# Patient Record
Sex: MALE | Race: WHITE
[De-identification: names, ages, dates, MRNs, and addresses within clinical notes are randomized per-mention and may not be internally consistent; named-entity substitution may affect disease eponyms.]

---

## 2018-11-21 PROBLEM — Z00.00 ENCOUNTER FOR PREVENTIVE HEALTH EXAMINATION: Status: ACTIVE | Noted: 2018-11-21

## 2018-11-26 ENCOUNTER — APPOINTMENT (OUTPATIENT)
Dept: ORTHOPEDIC SURGERY | Facility: CLINIC | Age: 58
End: 2018-11-26
Payer: COMMERCIAL

## 2018-11-26 VITALS
WEIGHT: 220 LBS | BODY MASS INDEX: 28.23 KG/M2 | DIASTOLIC BLOOD PRESSURE: 71 MMHG | HEART RATE: 65 BPM | HEIGHT: 74 IN | SYSTOLIC BLOOD PRESSURE: 126 MMHG

## 2018-11-26 PROCEDURE — 20550 NJX 1 TENDON SHEATH/LIGAMENT: CPT | Mod: F3

## 2018-11-26 PROCEDURE — 99203 OFFICE O/P NEW LOW 30 MIN: CPT | Mod: 25

## 2018-11-26 RX ORDER — OMEPRAZOLE MAGNESIUM 20 MG/1
20 TABLET, DELAYED RELEASE ORAL
Refills: 0 | Status: ACTIVE | COMMUNITY

## 2019-03-18 ENCOUNTER — APPOINTMENT (OUTPATIENT)
Dept: ORTHOPEDIC SURGERY | Facility: CLINIC | Age: 59
End: 2019-03-18
Payer: COMMERCIAL

## 2019-03-18 VITALS — HEIGHT: 74 IN | BODY MASS INDEX: 28.23 KG/M2 | WEIGHT: 220 LBS

## 2019-03-18 PROCEDURE — 20550 NJX 1 TENDON SHEATH/LIGAMENT: CPT | Mod: F3

## 2019-03-18 PROCEDURE — 99213 OFFICE O/P EST LOW 20 MIN: CPT | Mod: 25

## 2019-03-18 NOTE — DISCUSSION/SUMMARY
[FreeTextEntry1] : Further treatment options / recommendations were discussed.  At this time, he agreed to proceed with a repeat cortisone injection.\par \par I had a discussion regarding today's visit, the diagnosis, and my treatment recommendations.  The patient has agreed to this plan of management and has expressed full understanding.  All questions were fully answered to the patient's satisfaction.\par \par I spent at least 25 minutes of face-to-face time with the patient.  Over 50% of this time was spent on counseling the patient on the above diagnosis, treatment plan and prognosis.

## 2019-03-18 NOTE — PHYSICAL EXAM
[de-identified] : - Constitutional: This is a well-developed, well-nourished male, in no obvious distress.  \par - Psych: Patient is alert and oriented to person, place and time.  Patient has a normal mood and affect.\par - Cardiovascular: Normal pulses throughout the upper extremities. \par - Musculoskeletal: Gait is normal.  \par - Neuro: Strength and sensation are intact throughout the upper extremities.  Patient has normal coordination.\par - Respiratory:  Patient exhibits no evidence of shortness of breath or difficulty breathing.\par - Skin: No rashes, lesions, or other abnormalities are noted in the upper extremities.\par \par ---\par \par Examination of his left ring finger demonstrates mild tenderness along the A1 pulley.  There is no obvious swelling.  There is mild clicking with locking.  He is neurovascularly intact distally.

## 2019-03-18 NOTE — PROCEDURE
[FreeTextEntry1] : -  After a discussion of risks and benefits, the patient agreed to proceed with a cortisone injection.  \par -  Side:  Left\par -  Finger: Ring finger\par -  Medications: 0.5 cc of 1% lidocaine and 1 cc of Betamethasone, 6mg/cc, using sterile technique.\par -  Patient tolerated procedure well, without complications.\par -  Patient was told that the symptoms may worsen for a day or two, and should then begin to improve. \par -  Instructions: Patient was instructed on activity modification for the next several days.\par -  Follow-up: According to his symptoms.

## 2019-03-18 NOTE — HISTORY OF PRESENT ILLNESS
[FreeTextEntry1] : 3 1/2 months status post left ring finger trigger cortisone injection, #1.  He returns today, as his symptoms have begun to recur over the past week or so.

## 2019-11-01 PROBLEM — M65.342 TRIGGER RING FINGER OF LEFT HAND: Status: ACTIVE | Noted: 2018-11-26

## 2019-11-07 ENCOUNTER — APPOINTMENT (OUTPATIENT)
Dept: ORTHOPEDIC SURGERY | Facility: CLINIC | Age: 59
End: 2019-11-07
Payer: COMMERCIAL

## 2019-11-07 VITALS — HEIGHT: 74 IN | BODY MASS INDEX: 28.23 KG/M2 | WEIGHT: 220 LBS

## 2019-11-07 DIAGNOSIS — M65.342 TRIGGER FINGER, LEFT RING FINGER: ICD-10-CM

## 2019-11-07 PROCEDURE — 99213 OFFICE O/P EST LOW 20 MIN: CPT | Mod: 25

## 2019-11-07 PROCEDURE — 20550 NJX 1 TENDON SHEATH/LIGAMENT: CPT | Mod: F3

## 2019-11-07 NOTE — PHYSICAL EXAM
[de-identified] : - Constitutional: This is a well-developed, well-nourished male, in no obvious distress.  \par - Psych: Patient is alert and oriented to person, place and time.  Patient has a normal mood and affect.\par - Cardiovascular: Normal pulses throughout the upper extremities. \par - Musculoskeletal: Gait is normal.  \par - Neuro: Strength and sensation are intact throughout the upper extremities.  Patient has normal coordination.\par - Respiratory:  Patient exhibits no evidence of shortness of breath or difficulty breathing.\par - Skin: No rashes, lesions, or other abnormalities are noted in the upper extremities.\par \par ---\par \par Examination of his left ring finger demonstrates mild tenderness along the A1 pulley.  There is no obvious swelling.  There is mild triggering with terminal flexion.  He remains neurovascularly intact distally.

## 2019-11-07 NOTE — ADDENDUM
[FreeTextEntry1] : I, Nathan Carpenter, acted solely as a scribe for Dr. Mueller on this date 11/07/2019.

## 2019-11-07 NOTE — END OF VISIT
[FreeTextEntry3] : All medical records entries made by the Scribe were at my, Dr. Elbert Mueller MD, direction and personally dictated by me on 11/07/2019. I have personally reviewed the chart and agree that the record accurately reflects my personal performance of the history, physical exam, assessment, and plan. I have also personally directed, reviewed, and agreed with the chart.

## 2019-11-07 NOTE — DISCUSSION/SUMMARY
[FreeTextEntry1] : Further treatment options / recommendations were discussed.  At this time, he agreed to proceed with a repeat cortisone injection.  He does understand that if this does not ultimately provide him with long-term relief, then he may require surgical release in the future.\par \par I had a discussion regarding today's visit, the diagnosis, and my treatment recommendations.  The patient has agreed to this plan of management and has expressed full understanding.  All questions were fully answered to the patient's satisfaction.\par \par I spent at least 25 minutes of face-to-face time with the patient.  Over 50% of this time was spent on counseling the patient on the above diagnosis, treatment plan and prognosis.

## 2019-11-07 NOTE — HISTORY OF PRESENT ILLNESS
[FreeTextEntry1] : 7 1/2 months status post left ring finger trigger cortisone injection, #2.  \par \par He returns today, as his symptoms have begun to recur occasionally over the past couple of months. He notes that they usually reoccur when he wakes up in the morning or when performing some exercises. He denies symptoms in his other fingers.

## 2021-02-12 ENCOUNTER — OUTPATIENT (OUTPATIENT)
Dept: OUTPATIENT SERVICES | Facility: HOSPITAL | Age: 61
LOS: 1 days | End: 2021-02-12

## 2021-02-19 ENCOUNTER — OUTPATIENT (OUTPATIENT)
Dept: OUTPATIENT SERVICES | Facility: HOSPITAL | Age: 61
LOS: 1 days | End: 2021-02-19

## 2021-11-23 ENCOUNTER — OUTPATIENT (OUTPATIENT)
Dept: OUTPATIENT SERVICES | Facility: HOSPITAL | Age: 61
LOS: 1 days | End: 2021-11-23